# Patient Record
Sex: FEMALE | Race: WHITE | NOT HISPANIC OR LATINO | Employment: FULL TIME | URBAN - METROPOLITAN AREA
[De-identification: names, ages, dates, MRNs, and addresses within clinical notes are randomized per-mention and may not be internally consistent; named-entity substitution may affect disease eponyms.]

---

## 2017-10-31 ENCOUNTER — OFFICE VISIT (OUTPATIENT)
Dept: OBSTETRICS AND GYNECOLOGY | Age: 22
End: 2017-10-31

## 2017-10-31 VITALS
BODY MASS INDEX: 22.36 KG/M2 | DIASTOLIC BLOOD PRESSURE: 80 MMHG | HEIGHT: 69 IN | WEIGHT: 151 LBS | SYSTOLIC BLOOD PRESSURE: 120 MMHG

## 2017-10-31 DIAGNOSIS — N92.6 MISSED MENSES: Primary | ICD-10-CM

## 2017-10-31 DIAGNOSIS — Z01.419 ENCOUNTER FOR GYNECOLOGICAL EXAMINATION: ICD-10-CM

## 2017-10-31 LAB
B-HCG UR QL: NEGATIVE
INTERNAL NEGATIVE CONTROL: NEGATIVE
INTERNAL POSITIVE CONTROL: POSITIVE
Lab: NORMAL

## 2017-10-31 PROCEDURE — 99395 PREV VISIT EST AGE 18-39: CPT | Performed by: NURSE PRACTITIONER

## 2017-10-31 PROCEDURE — 81025 URINE PREGNANCY TEST: CPT | Performed by: NURSE PRACTITIONER

## 2017-10-31 NOTE — PROGRESS NOTES
"Subjective       History of Present Illness    Chief Complaint   Patient presents with   • Gynecologic Exam     Pt requests STD testing       Joana Donis is a 22 y.o. female who presents for annual exam. Patient previously had nexplanon and had it removed last year.  Her periods are very regular. She originally reported her last cycle as 9/6 but realized it was actually 10/6, so she is not late.   She took OCPs for a few months but had difficulty remembering them and stopped them.  She is sexually active and requests testing but declines serum.  She is interested in starting back on OCPs.  She wants something with the least side effects possible, she feels very \"sensitive\" to hormones.    OB History    Para Term  AB Living   0 0 0 0 0 0   SAB TAB Ectopic Multiple Live Births   0 0 0 0 0             The following portions of the patient's history were reviewed and updated as appropriate: allergies, current medications, past family history, past medical history, past social history, past surgical history and problem list.    Her menses are regular every 28-30 days, lasting 4-7 days.    Current contraception: condoms  History of abnormal Pap smear: no  Received Gardasil immunization: no.  Patient declines  Perform regular self breast exam: yes - occasionally  Family history of uterine or ovarian cancer: no  Family History of colon cancer: yes - paternal grandmother in 80s  Family history of breast cancer: yes - paternal aunt in 40s.  She is unsure if she had genetic testing or not.    Mammogram: not indicated.  Colonoscopy: not indicated.  DEXA: not indicated.  Last Pap:never    Smoking status: Former Smoker                                                              Packs/day: 0.00      Years: 0.00         Smokeless status: Not on file                       Exercise: moderately active  Calcium/Vitamin D: uses supplements    The following portions of the patient's history were reviewed and updated " "as appropriate: allergies, current medications, past family history, past medical history, past social history, past surgical history and problem list.    Review of Systems   Constitutional: Negative.    HENT: Negative.    Eyes: Negative.    Respiratory: Negative.    Cardiovascular: Negative.    Gastrointestinal: Negative.    Endocrine: Negative.    Genitourinary: Negative.    Musculoskeletal: Negative.    Skin: Negative.    Allergic/Immunologic: Negative.    Neurological: Negative.    Hematological: Negative.    Psychiatric/Behavioral: Negative.          Objective   Physical Exam   Constitutional: She is oriented to person, place, and time. She appears well-developed and well-nourished.   Neck: No thyroid mass present.   Cardiovascular: Normal rate, regular rhythm and normal heart sounds.    Pulmonary/Chest: Effort normal and breath sounds normal. Right breast exhibits no mass, no nipple discharge, no skin change and no tenderness. Left breast exhibits no mass, no nipple discharge, no skin change and no tenderness.   Abdominal: Soft. There is no tenderness.   Genitourinary: Vagina normal and uterus normal. There is no rash or lesion on the right labia. There is no rash or lesion on the left labia. Cervix exhibits no motion tenderness, no discharge and no friability. Right adnexum displays no mass and no tenderness. Left adnexum displays no mass and no tenderness.   Neurological: She is alert and oriented to person, place, and time.   Skin: Skin is warm and dry.   Psychiatric: She has a normal mood and affect. Her behavior is normal.   Vitals reviewed.      /80  Ht 69\" (175.3 cm)  Wt 151 lb (68.5 kg)  LMP 10/06/2017  BMI 22.3 kg/m2    Assessment/Plan   Joana was seen today for gynecologic exam.    Diagnoses and all orders for this visit:    Missed menses  -     POC Pregnancy, Urine  -     IGP,CtNg,rfx Apt HPV All Pth - ThinPrep Vial, Cervix; Future    Encounter for gynecological examination  -     " IGP,CtNg,rfx Apt HPV All Pth - ThinPrep Vial, Cervix; Future    Other orders  -     Norethin-Eth Estrad-Fe Biphas (LO LOESTRIN FE) 1 MG-10 MCG / 10 MCG tablet; Take 1 tablet by mouth Daily.        Breast self exam technique reviewed and patient encouraged to perform self-exam monthly.  Discussed healthy lifestyle modifications.  Pap smear done today with gc/chl, patient declines serum testing  Recommended 30 minutes of aerobic exercise five times per week.  Discussed calcium needs to prevent osteoporosis

## 2017-11-03 ENCOUNTER — TELEPHONE (OUTPATIENT)
Dept: OBSTETRICS AND GYNECOLOGY | Age: 22
End: 2017-11-03

## 2017-11-03 LAB
C TRACH RRNA CVX QL NAA+PROBE: NEGATIVE
CONV .: NORMAL
CYTOLOGIST CVX/VAG CYTO: NORMAL
CYTOLOGY CVX/VAG DOC THIN PREP: NORMAL
DX ICD CODE: NORMAL
HIV 1 & 2 AB SER-IMP: NORMAL
N GONORRHOEA RRNA CVX QL NAA+PROBE: NEGATIVE
OTHER STN SPEC: NORMAL
PATH REPORT.FINAL DX SPEC: NORMAL
STAT OF ADQ CVX/VAG CYTO-IMP: NORMAL

## 2017-11-03 NOTE — TELEPHONE ENCOUNTER
----- Message from JACOB Fleming sent at 11/3/2017  9:28 AM EDT -----  Notify pap and gc/chl negative

## 2018-10-18 ENCOUNTER — OFFICE VISIT (OUTPATIENT)
Dept: OBSTETRICS AND GYNECOLOGY | Age: 23
End: 2018-10-18

## 2018-10-18 VITALS
BODY MASS INDEX: 22.22 KG/M2 | WEIGHT: 150 LBS | DIASTOLIC BLOOD PRESSURE: 62 MMHG | SYSTOLIC BLOOD PRESSURE: 100 MMHG | HEIGHT: 69 IN

## 2018-10-18 DIAGNOSIS — Z77.21 EXPOSURE TO BLOOD OR BODY FLUID: Primary | ICD-10-CM

## 2018-10-18 DIAGNOSIS — N76.0 ACUTE VAGINITIS: ICD-10-CM

## 2018-10-18 PROCEDURE — 99213 OFFICE O/P EST LOW 20 MIN: CPT | Performed by: NURSE PRACTITIONER

## 2018-10-18 NOTE — PROGRESS NOTES
"Subjective   Joana Donis is a 23 y.o. female is being seen today for   Chief Complaint   Patient presents with   • Vaginal Discharge     Pt c/o a little yelloe discharge and a little odor. Also c/o vaginal discomfort.   .    History of Present Illness     Patient here for vaginal culture and STD test.  She feels she may just be \"paranoid\" but things just feel \"different\" to her for the past few weeks.  She has had some slight vaginal discomfort and change in discharge.  She also noticed an odor occasionally.  She is sexually active.  No change in menses.  She denies any bowel or bladder changes or pelvic pain.  No fevers.      The following portions of the patient's history were reviewed and updated as appropriate: allergies, current medications, past family history, past medical history, past social history, past surgical history and problem list.    /62   Ht 175.3 cm (69\")   Wt 68 kg (150 lb)   LMP 09/22/2018   BMI 22.15 kg/m²         Review of Systems   Constitutional: Negative.    HENT: Negative.    Eyes: Negative.    Respiratory: Negative.    Cardiovascular: Negative.    Gastrointestinal: Negative.    Endocrine: Negative.    Genitourinary: Positive for vaginal discharge and vaginal pain. Negative for genital sores, menstrual problem and pelvic pain.   Musculoskeletal: Negative.    Skin: Negative.    Allergic/Immunologic: Negative.    Neurological: Negative.    Hematological: Negative.    Psychiatric/Behavioral: Negative.        Objective   Physical Exam   Constitutional: She is oriented to person, place, and time. She appears well-developed and well-nourished.   Genitourinary: Uterus normal. Uterus is not tender. Cervix exhibits no motion tenderness, no discharge and no friability.   Genitourinary Comments: Small amount of yellow discharge noted, vagina otherwise normal.  No lesions.    Neurological: She is alert and oriented to person, place, and time.   Psychiatric: She has a normal mood and " affect. Her behavior is normal.         Assessment/Plan   Joana was seen today for vaginal discharge.    Diagnoses and all orders for this visit:    Exposure to blood or body fluid  -     NuSwab VG+ - Swab, Vagina    Acute vaginitis  -     NuSwab VG+ - Swab, Vagina      Vaginal culture sent.  Patient will await results for treatment.  No obvious infection on exam and patient is not symptomatic today.  Follow up PRN

## 2018-10-24 ENCOUNTER — TELEPHONE (OUTPATIENT)
Dept: OBSTETRICS AND GYNECOLOGY | Age: 23
End: 2018-10-24

## 2018-10-26 ENCOUNTER — TELEPHONE (OUTPATIENT)
Dept: OBSTETRICS AND GYNECOLOGY | Age: 23
End: 2018-10-26

## 2018-10-26 LAB
A VAGINAE DNA VAG QL NAA+PROBE: ABNORMAL SCORE
BVAB2 DNA VAG QL NAA+PROBE: ABNORMAL SCORE
C ALBICANS DNA VAG QL NAA+PROBE: NEGATIVE
C GLABRATA DNA VAG QL NAA+PROBE: NEGATIVE
C TRACH RRNA SPEC QL NAA+PROBE: POSITIVE
MEGA1 DNA VAG QL NAA+PROBE: ABNORMAL SCORE
N GONORRHOEA RRNA SPEC QL NAA+PROBE: NEGATIVE
T VAGINALIS RRNA SPEC QL NAA+PROBE: NEGATIVE

## 2018-10-26 RX ORDER — METRONIDAZOLE 500 MG/1
500 TABLET ORAL 2 TIMES DAILY
Qty: 14 TABLET | Refills: 0 | Status: SHIPPED | OUTPATIENT
Start: 2018-10-26 | End: 2018-11-02

## 2018-10-26 RX ORDER — AZITHROMYCIN 500 MG/1
1000 TABLET, FILM COATED ORAL ONCE
Qty: 2 TABLET | Refills: 0 | Status: SHIPPED | OUTPATIENT
Start: 2018-10-26 | End: 2018-10-26

## 2018-10-26 NOTE — TELEPHONE ENCOUNTER
----- Message from JACOB Fleming sent at 10/26/2018  1:54 PM EDT -----  Notify patient vaginal swab showed BV and chlamydia.  I sent in two antibiotics for her to take.  Her partner needs to be treated as well and they should abstain for 1 week after treatment.  Schedule matt as well.

## 2018-10-26 NOTE — TELEPHONE ENCOUNTER
Notified pt who had Ae sched 11-9-18 that was too soon for matt, rs to later so pt did not have to come in 3x in a month.

## 2018-11-26 ENCOUNTER — OFFICE VISIT (OUTPATIENT)
Dept: OBSTETRICS AND GYNECOLOGY | Age: 23
End: 2018-11-26

## 2018-11-26 VITALS
WEIGHT: 149 LBS | SYSTOLIC BLOOD PRESSURE: 126 MMHG | DIASTOLIC BLOOD PRESSURE: 76 MMHG | BODY MASS INDEX: 21.33 KG/M2 | HEIGHT: 70 IN

## 2018-11-26 DIAGNOSIS — Z20.2 CHLAMYDIA CONTACT, TREATED: ICD-10-CM

## 2018-11-26 DIAGNOSIS — Z01.419 WELL WOMAN EXAM WITH ROUTINE GYNECOLOGICAL EXAM: Primary | ICD-10-CM

## 2018-11-26 DIAGNOSIS — Z30.09 CONTRACEPTIVE EDUCATION: ICD-10-CM

## 2018-11-26 PROCEDURE — 99395 PREV VISIT EST AGE 18-39: CPT | Performed by: PHYSICIAN ASSISTANT

## 2018-11-26 RX ORDER — ETONOGESTREL AND ETHINYL ESTRADIOL 11.7; 2.7 MG/1; MG/1
1 INSERT, EXTENDED RELEASE VAGINAL
Qty: 1 EACH | Refills: 12 | Status: SHIPPED | OUTPATIENT
Start: 2018-11-26 | End: 2019-11-26

## 2018-11-26 NOTE — PROGRESS NOTES
Subjective     Chief Complaint   Patient presents with   • Gynecologic Exam     annual exam last pap 2017 neg/no hpv, FRANCIE chlamydia 10/18/2018       History of Present Illness    Joana Donis is a 23 y.o.  who presents for annual exam.    Recently seen and dxed with chlamydia  She let partner know and took her meds, her sx's have improved  She is on BCP but not taking them regularly. Admits that she starts and stops after taking them for a while  She is unsure what she would like to do in the meantime  Uses condoms but not consistently    She is working as a  and a  currently  Admits to [employer] use  Unsure what she wants to do/study    Tries to stay healthy and exercise    She is a pt of Dr Cancino    Her menses are regular every 28-30 days, lasting 0-3 days, dysmenorrhea none   Obstetric History:  OB History      Para Term  AB Living    0 0 0 0 0 0    SAB TAB Ectopic Molar Multiple Live Births    0 0 0 0 0 0         Menstrual History:     Patient's last menstrual period was 10/23/2018 (approximate).         Current contraception: condoms  History of abnormal Pap smear: no  Received Gardasil immunization: no  Perform regular self breast exam: yes - occl  Family history of uterine or ovarian cancer: no  Family History of colon cancer: no  Family history of breast cancer: yes - paternal aunt, unsure age and will look into this    Mammogram: not indicated.  Colonoscopy: not indicated.  DEXA: not indicated.    Exercise: moderately active  Calcium/Vitamin D: adequate intake    The following portions of the patient's history were reviewed and updated as appropriate: allergies, current medications, past family history, past medical history, past social history, past surgical history and problem list.    Review of Systems   Genitourinary:        Recent +chlamydia   All other systems reviewed and are negative.      Review of Systems   Constitutional: Negative for fatigue.  "  Respiratory: Negative for shortness of breath.    Gastrointestinal: Negative for abdominal pain.   Genitourinary: Negative for dysuria.   Neurological: Negative for headaches.   Psychiatric/Behavioral: Negative for dysphoric mood.         Objective   Physical Exam    /76   Ht 176.5 cm (69.5\")   Wt 67.6 kg (149 lb)   LMP 10/23/2018 (Approximate)   Breastfeeding? No   BMI 21.69 kg/m²     General:   alert, appears stated age and cooperative   Neck: no adenopathy and thyroid normal to palpation   Heart: regular rate and rhythm   Lungs: clear to auscultation bilaterally   Abdomen: soft and nontender   Breast: inspection negative, no nipple discharge or bleeding, no masses or nodularity palpable   Vulva: normal   Vagina: normal mucosa, normal discharge   Cervix: no lesions   Uterus: normal size, mobile   Adnexa: normal adnexa and no mass, fullness, tenderness   Rectal: not indicated     Assessment/Plan   Joana was seen today for gynecologic exam.    Diagnoses and all orders for this visit:    Well woman exam with routine gynecological exam  -     Chlamydia trachomatis, Neisseria gonorrhoeae, PCR - Swab, Vagina    Chlamydia contact, treated  -     Chlamydia trachomatis, Neisseria gonorrhoeae, PCR - Swab, Vagina    Contraceptive education    Other orders  -     etonogestrel-ethinyl estradiol (NUVARING) 0.12-0.015 MG/24HR vaginal ring; Insert 1 each into the vagina Every 28 (Twenty-Eight) Days. Insert pv for 3 wks, then remove for 1 wk        All questions answered.  Breast self exam technique reviewed and patient encouraged to perform self-exam monthly.  Discussed healthy lifestyle modifications.  Recommended 30 minutes of aerobic exercise five times per week.  Discussed calcium needs to prevent osteoporosis.    Disc pap guidelines, she is utd  Plan std re check  Enc condoms at all times   Disc BC, she wasn't ready to commit to any BC but I did send in nuva ring and she may consider starting that. Disc proper " use  Also disc kyleena and jose c CHRISTIAN.  She will call with questions.

## 2018-11-28 ENCOUNTER — TELEPHONE (OUTPATIENT)
Dept: OBSTETRICS AND GYNECOLOGY | Age: 23
End: 2018-11-28

## 2018-11-28 LAB
C TRACH RRNA SPEC QL NAA+PROBE: NEGATIVE
N GONORRHOEA RRNA SPEC QL NAA+PROBE: NEGATIVE

## 2018-11-28 NOTE — TELEPHONE ENCOUNTER
----- Message from [facility] Vincent sent at 11/28/2018  8:59 AM EST -----  Let her know her std result is negative

## 2019-12-02 ENCOUNTER — OFFICE VISIT (OUTPATIENT)
Dept: OBSTETRICS AND GYNECOLOGY | Age: 24
End: 2019-12-02

## 2019-12-02 VITALS
SYSTOLIC BLOOD PRESSURE: 120 MMHG | BODY MASS INDEX: 22.96 KG/M2 | WEIGHT: 155 LBS | DIASTOLIC BLOOD PRESSURE: 82 MMHG | HEIGHT: 69 IN

## 2019-12-02 DIAGNOSIS — Z01.419 WELL WOMAN EXAM WITH ROUTINE GYNECOLOGICAL EXAM: Primary | ICD-10-CM

## 2019-12-02 PROCEDURE — 99395 PREV VISIT EST AGE 18-39: CPT | Performed by: PHYSICIAN ASSISTANT

## 2019-12-02 RX ORDER — FLUOXETINE 10 MG/1
10 CAPSULE ORAL DAILY
COMMUNITY
End: 2020-07-10

## 2019-12-02 NOTE — PROGRESS NOTES
Subjective     Chief Complaint   Patient presents with   • Gynecologic Exam     annual exam. last pap 2017 neg/no hpv       History of Present Illness    Joana Donis is a 24 y.o.  who presents for annual exam.    Pt here for her annual exam  She is doing well  Never did start the nuva ring  She has stomach issues and is worried about how she will respond  Has not been SA in over a year  Will plan condoms or call if she wants to try something different    No new med hx    Pt of Dr ramos    Her menses are regular every 28-30 days, lasting 0-3 days, dysmenorrhea none   Obstetric History:  OB History      Para Term  AB Living    0 0 0 0 0 0    SAB TAB Ectopic Molar Multiple Live Births    0 0 0 0 0 0         Menstrual History:     Patient's last menstrual period was 2019 (approximate).         Current contraception: abstinence  History of abnormal Pap smear: no  Received Gardasil immunization: no  Perform regular self breast exam: yes - occl  Family history of uterine or ovarian cancer: no  Family History of colon cancer: yes - PGM in her 70-80's  Family history of breast cancer: yes - paternal aunt in her 40's    Mammogram: not indicated.  Colonoscopy: not indicated.  DEXA: not indicated.    Exercise: moderately active  Calcium/Vitamin D: adequate intake    The following portions of the patient's history were reviewed and updated as appropriate: allergies, current medications, past family history, past medical history, past social history, past surgical history and problem list.    Review of Systems   All other systems reviewed and are negative.      Review of Systems   Constitutional: Negative for fatigue.   Respiratory: Negative for shortness of breath.    Gastrointestinal: Negative for abdominal pain.   Genitourinary: Negative for dysuria.   Neurological: Negative for headaches.   Psychiatric/Behavioral: Negative for dysphoric mood.         Objective   Physical Exam    BP  "120/82   Ht 175.3 cm (69\")   Wt 70.3 kg (155 lb)   LMP 11/18/2019 (Approximate)   Breastfeeding? No   BMI 22.89 kg/m²   General:   alert, comfortable and no distress   Heart: regular rate and rhythm   Lungs: clear to auscultation bilaterally   Breast: normal appearance, no masses or tenderness   Neck: no adenopathy and no carotid bruit   Abdomen: {normal findings: symmetric, soft, non-tender   CVA: Not performed today   Pelvis: External genitalia: normal general appearance  Cervix: normal appearance  Adnexa: normal bimanual exam  Uterus: normal single, nontender  Bladder: wnl   Extremities: Not performed today   Neurologic: negative   Psychiatric: Normal affect, judgement, and mood     Assessment/Plan   Joana was seen today for gynecologic exam.    Diagnoses and all orders for this visit:    Well woman exam with routine gynecological exam        All questions answered.  Breast self exam technique reviewed and patient encouraged to perform self-exam monthly.  Discussed healthy lifestyle modifications.  Recommended 30 minutes of aerobic exercise five times per week.  Discussed calcium needs to prevent osteoporosis.      Pap not needed  Will not need std testing as she has not been SA  Contraceptive options discussed but pt prefers to wait at this time  Disc myrisk, she will check with her aunt to see if she has had genetic testing  Gave HO on gardasil, will consider             "

## 2020-07-10 ENCOUNTER — OFFICE VISIT (OUTPATIENT)
Dept: OBSTETRICS AND GYNECOLOGY | Age: 25
End: 2020-07-10

## 2020-07-10 ENCOUNTER — TELEPHONE (OUTPATIENT)
Dept: OBSTETRICS AND GYNECOLOGY | Age: 25
End: 2020-07-10

## 2020-07-10 VITALS
BODY MASS INDEX: 20.73 KG/M2 | WEIGHT: 140 LBS | DIASTOLIC BLOOD PRESSURE: 60 MMHG | HEIGHT: 69 IN | SYSTOLIC BLOOD PRESSURE: 110 MMHG

## 2020-07-10 DIAGNOSIS — R30.0 DYSURIA: ICD-10-CM

## 2020-07-10 DIAGNOSIS — N89.8 VAGINAL DISCHARGE: Primary | ICD-10-CM

## 2020-07-10 LAB
BILIRUB BLD-MCNC: NEGATIVE MG/DL
CLARITY, POC: CLEAR
COLOR UR: YELLOW
GLUCOSE UR STRIP-MCNC: NEGATIVE MG/DL
KETONES UR QL: NEGATIVE
LEUKOCYTE EST, POC: NEGATIVE
NITRITE UR-MCNC: NEGATIVE MG/ML
PH UR: 8.5 [PH] (ref 5–8)
PROT UR STRIP-MCNC: NEGATIVE MG/DL
RBC # UR STRIP: NEGATIVE /UL
SP GR UR: 1.02 (ref 1–1.03)
UROBILINOGEN UR QL: NORMAL

## 2020-07-10 PROCEDURE — 81002 URINALYSIS NONAUTO W/O SCOPE: CPT | Performed by: PHYSICIAN ASSISTANT

## 2020-07-10 PROCEDURE — 99213 OFFICE O/P EST LOW 20 MIN: CPT | Performed by: PHYSICIAN ASSISTANT

## 2020-07-10 RX ORDER — FLUOXETINE HYDROCHLORIDE 20 MG/1
CAPSULE ORAL
COMMUNITY
Start: 2020-06-23

## 2020-07-10 NOTE — PROGRESS NOTES
"Subjective     Chief Complaint   Patient presents with   • Gynecologic Exam     retained tampon       Joana Donis is a 24 y.o.  whose LMP is Patient's last menstrual period was 2020 (approximate). presents with possible retained tampon    Went to sleep with one in but woke up without it  Can't find it and doesn't feel it   Does note some d/c and a weird odor  Has happened once before     Not currently on BC  In same sex relationship  Advised condoms if SA with male    Pt of Dr ramos  New to me with this conern      No Additional Complaints Reported    The following portions of the patient's history were reviewed and updated as appropriate:vital signs, allergies, current medications, past family history, past medical history, past social history, past surgical history and problem list      Review of Systems   Genitourinary:positive for ?retained tampon     Objective      /60   Ht 175.3 cm (69\")   Wt 63.5 kg (140 lb)   LMP 2020 (Approximate)   Breastfeeding No   BMI 20.67 kg/m²     Physical Exam    General:   alert, comfortable and no distress   Heart: Not performed today   Lungs: Not performed today.   Breast: Not performed today   Neck: na   Abdomen: {Not performed today   CVA: Not performed today   Pelvis: External genitalia: normal general appearance  Vaginal: normal mucosa without prolapse or lesions and no tampons noted  Cervix: normal appearance and neg CMT  Adnexa: normal bimanual exam and non tender  Uterus: normal single, nontender   Extremities: Not performed today   Neurologic: negative   Psychiatric: Normal affect, judgement, and mood       Lab Review   Labs: No data reviewed     Imaging   No data reviewed    Assessment/Plan     ASSESSMENT  1. Vaginal discharge    2. Dysuria        PLAN  1.   Orders Placed This Encounter   Procedures   • Urine Culture - , Urine, Random Void   • POC Urinalysis Dipstick       2. No tampon found. Cervix wnl. Not at high risk of PID or " STD.      Follow up: 5 month(s)    [facility] Weathers  7/10/2020

## 2020-07-10 NOTE — TELEPHONE ENCOUNTER
Pt states she thinks she lost a tampon and its been inside of her for 2 days. Denies any symptoms. Will you see her today?

## 2020-07-12 LAB
BACTERIA UR CULT: NO GROWTH
BACTERIA UR CULT: NORMAL

## 2020-07-13 ENCOUNTER — TELEPHONE (OUTPATIENT)
Dept: OBSTETRICS AND GYNECOLOGY | Age: 25
End: 2020-07-13

## 2020-07-13 NOTE — TELEPHONE ENCOUNTER
----- Message from [facility] Vincent sent at 7/13/2020  9:32 AM EDT -----  Let her know her urine culture is neg for infection

## 2021-10-01 ENCOUNTER — TELEPHONE (OUTPATIENT)
Dept: OBSTETRICS AND GYNECOLOGY | Age: 26
End: 2021-10-01

## 2022-05-12 ENCOUNTER — OFFICE VISIT (OUTPATIENT)
Dept: OBSTETRICS AND GYNECOLOGY | Age: 27
End: 2022-05-12

## 2022-05-12 VITALS
SYSTOLIC BLOOD PRESSURE: 106 MMHG | DIASTOLIC BLOOD PRESSURE: 62 MMHG | WEIGHT: 135 LBS | BODY MASS INDEX: 19.99 KG/M2 | HEIGHT: 69 IN

## 2022-05-12 DIAGNOSIS — R82.998 DARK URINE: ICD-10-CM

## 2022-05-12 DIAGNOSIS — N89.8 VAGINAL DISCHARGE: Primary | ICD-10-CM

## 2022-05-12 PROCEDURE — 99213 OFFICE O/P EST LOW 20 MIN: CPT | Performed by: PHYSICIAN ASSISTANT

## 2022-05-12 NOTE — PROGRESS NOTES
"Subjective     Chief Complaint   Patient presents with   • Gynecologic Exam     C/o possible retained tampon       Joana Donis is a 26 y.o.  whose LMP is Patient's last menstrual period was 2022 (approximate). presents with possible retained tampon    Is noting an odor and feels like she has left sided pelvic pain/gurgling  Has a yellow/green d/c  Placed a tampon 5 days ago and doesn't recall removing it    Denies std risk  Not on BC-in a homosexual relationship    Has annual scheduled next month    No Additional Complaints Reported    The following portions of the patient's history were reviewed and updated as appropriate:vital signs, allergies, current medications, past family history, past medical history, past social history, past surgical history and problem list      Review of Systems   Genitourinary:positive for vaginal d/c     Objective      /62   Ht 175.3 cm (69\")   Wt 61.2 kg (135 lb)   LMP 2022 (Approximate)   Breastfeeding No   BMI 19.94 kg/m²     Physical Exam    General:   alert, comfortable and no distress   Heart: Not performed today   Lungs: Not performed today.   Breast: Not performed today   Neck: Not performed today   Abdomen: Not performed today   CVA: Not performed today   Pelvis: External genitalia: normal general appearance  Vaginal: normal mucosa without prolapse or lesions, discharge, white and no tampon found and no unusual odor noted  Cervix: normal appearance and nuswab obtained  Adnexa: normal bimanual exam  Uterus: normal single, nontender   Extremities: Not performed today   Neurologic: negative   Psychiatric: Normal affect, judgement, and mood       Lab Review   Labs: No data reviewed    Imaging   No data reviewed    Assessment & Plan     ASSESSMENT  1. Vaginal discharge    2. Dark urine          PLAN  1.   Orders Placed This Encounter   Procedures   • NuSwab BV & Candida - Swab, Cervix   • Urine Culture - Urine, Urine, Random Void       2. nuswab " "obtained. No retained tampon noted today. Does note a \"gurgly\" sensation. Suspect GI relate but urine to be sent off    Follow up: 1 month(s)    [facility] Weathers  5/12/2022           "

## 2022-05-13 LAB
A VAGINAE DNA VAG QL NAA+PROBE: NORMAL SCORE
BVAB2 DNA VAG QL NAA+PROBE: NORMAL SCORE
C ALBICANS DNA VAG QL NAA+PROBE: NEGATIVE
C GLABRATA DNA VAG QL NAA+PROBE: NEGATIVE
MEGA1 DNA VAG QL NAA+PROBE: NORMAL SCORE

## 2022-05-14 LAB
BACTERIA UR CULT: NORMAL
BACTERIA UR CULT: NORMAL

## 2022-05-16 ENCOUNTER — TELEPHONE (OUTPATIENT)
Dept: OBSTETRICS AND GYNECOLOGY | Age: 27
End: 2022-05-16

## 2022-05-16 NOTE — TELEPHONE ENCOUNTER
----- Message from [facility] Vincent sent at 5/16/2022  9:35 AM EDT -----  Let her know her urine result is wnl and her vaginal swab is negative for bv and yeast

## 2022-06-01 ENCOUNTER — OFFICE VISIT (OUTPATIENT)
Dept: OBSTETRICS AND GYNECOLOGY | Age: 27
End: 2022-06-01

## 2022-06-01 VITALS
SYSTOLIC BLOOD PRESSURE: 118 MMHG | DIASTOLIC BLOOD PRESSURE: 70 MMHG | WEIGHT: 132 LBS | HEIGHT: 69 IN | BODY MASS INDEX: 19.55 KG/M2

## 2022-06-01 DIAGNOSIS — Z01.419 WELL WOMAN EXAM WITH ROUTINE GYNECOLOGICAL EXAM: Primary | ICD-10-CM

## 2022-06-01 DIAGNOSIS — Z12.4 ENCOUNTER FOR PAPANICOLAOU SMEAR FOR CERVICAL CANCER SCREENING: ICD-10-CM

## 2022-06-01 PROCEDURE — 99395 PREV VISIT EST AGE 18-39: CPT | Performed by: PHYSICIAN ASSISTANT

## 2022-06-01 PROCEDURE — 3008F BODY MASS INDEX DOCD: CPT | Performed by: PHYSICIAN ASSISTANT

## 2022-06-01 RX ORDER — LIDOCAINE 50 MG/G
PATCH TOPICAL
COMMUNITY
Start: 2022-05-17

## 2022-06-01 RX ORDER — CYCLOBENZAPRINE HCL 10 MG
TABLET ORAL
COMMUNITY
Start: 2022-05-17

## 2022-06-01 RX ORDER — ONDANSETRON 4 MG/1
TABLET, ORALLY DISINTEGRATING ORAL
COMMUNITY
Start: 2022-05-17
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
## 2022-06-03 LAB
CONV .: NORMAL
CYTOLOGIST CVX/VAG CYTO: NORMAL
CYTOLOGY CVX/VAG DOC CYTO: NORMAL
CYTOLOGY CVX/VAG DOC THIN PREP: NORMAL
DX ICD CODE: NORMAL
HIV 1 & 2 AB SER-IMP: NORMAL
OTHER STN SPEC: NORMAL
STAT OF ADQ CVX/VAG CYTO-IMP: NORMAL

## 2022-06-06 ENCOUNTER — TELEPHONE (OUTPATIENT)
Dept: OBSTETRICS AND GYNECOLOGY | Age: 27
End: 2022-06-06

## 2022-06-06 NOTE — TELEPHONE ENCOUNTER
----- Message from [facility] Vincent sent at 6/6/2022 10:56 AM EDT -----  Please notify pt that her pap is wnl

## 2023-03-30 ENCOUNTER — OFFICE VISIT (OUTPATIENT)
Dept: OBSTETRICS AND GYNECOLOGY | Age: 28
End: 2023-03-30
Payer: COMMERCIAL

## 2023-03-30 VITALS
WEIGHT: 133 LBS | SYSTOLIC BLOOD PRESSURE: 128 MMHG | DIASTOLIC BLOOD PRESSURE: 76 MMHG | HEIGHT: 69 IN | BODY MASS INDEX: 19.7 KG/M2

## 2023-03-30 DIAGNOSIS — Z11.3 SCREEN FOR STD (SEXUALLY TRANSMITTED DISEASE): Primary | ICD-10-CM

## 2023-03-30 PROCEDURE — 99213 OFFICE O/P EST LOW 20 MIN: CPT | Performed by: PHYSICIAN ASSISTANT
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
## 2023-03-31 LAB
HBV SURFACE AG SERPL QL IA: NEGATIVE
HCV IGG SERPL QL IA: NON REACTIVE
HIV 1+2 AB+HIV1 P24 AG SERPL QL IA: NON REACTIVE
RPR SER QL: NON REACTIVE

## 2023-04-01 LAB
C TRACH RRNA SPEC QL NAA+PROBE: NEGATIVE
N GONORRHOEA RRNA SPEC QL NAA+PROBE: NEGATIVE
T VAGINALIS RRNA SPEC QL NAA+PROBE: NEGATIVE

## 2023-10-12 ENCOUNTER — OFFICE VISIT (OUTPATIENT)
Dept: OBSTETRICS AND GYNECOLOGY | Age: 28
End: 2023-10-12
Payer: COMMERCIAL

## 2023-10-12 VITALS — WEIGHT: 139 LBS | BODY MASS INDEX: 19.9 KG/M2 | HEIGHT: 70 IN

## 2023-10-12 DIAGNOSIS — Z01.419 WELL WOMAN EXAM WITH ROUTINE GYNECOLOGICAL EXAM: Primary | ICD-10-CM

## 2023-10-12 NOTE — PROGRESS NOTES
Subjective     Chief Complaint   Patient presents with    Gynecologic Exam     Annual, Last pap 2022 neg  Cc:  no problem       History of Present Illness    Joana Donis is a 28 y.o.  who presents for annual exam.    Doing well  Her menses are regular every 28-30 days, lasting 4-7 days, dysmenorrhea none   Pap UTD  Using condoms for contraception  Declines other forms of contraception  Declines std screening  No other GYN concerns or complaints today    Obstetric History:  OB History          0    Para   0    Term   0       0    AB   0    Living   0         SAB   0    IAB   0    Ectopic   0    Molar   0    Multiple   0    Live Births   0               Menstrual History:     Patient's last menstrual period was 10/02/2023.         Current contraception: condoms  History of abnormal Pap smear: no  Received Gardasil immunization: no  Perform regular self breast exam: yes - .  Family history of uterine or ovarian cancer: no  Family History of colon cancer: yes - PGM  Family history of breast cancer: yes - paternal aunt    Mammogram: not indicated.  Colonoscopy: not indicated.  DEXA: not indicated.    Exercise: very active or moderately active - walks 5-6 miles a day, works at restaurant so on feet a lot  Calcium/Vitamin D: adequate intake    The following portions of the patient's history were reviewed and updated as appropriate: allergies, current medications, past family history, past medical history, past social history, past surgical history, and problem list.    Review of Systems   Constitutional: Negative.    Respiratory: Negative.     Cardiovascular: Negative.    Gastrointestinal: Negative.    Genitourinary: Negative.    Skin: Negative.    Psychiatric/Behavioral: Negative.             Objective   Physical Exam  Constitutional:       General: She is awake.      Appearance: Normal appearance. She is well-developed.   HENT:      Head: Normocephalic and atraumatic.      Nose: Nose  normal.   Neck:      Thyroid: No thyroid mass, thyromegaly or thyroid tenderness.   Cardiovascular:      Rate and Rhythm: Normal rate and regular rhythm.      Pulses: Normal pulses.      Heart sounds: Normal heart sounds.   Pulmonary:      Effort: Pulmonary effort is normal.      Breath sounds: Normal breath sounds.   Chest:   Breasts:     Breasts are symmetrical.      Right: Normal. No swelling, bleeding, inverted nipple, mass, nipple discharge, skin change or tenderness.      Left: Normal. No swelling, bleeding, inverted nipple, mass, nipple discharge, skin change or tenderness.   Abdominal:      General: Abdomen is flat. Bowel sounds are normal.      Palpations: Abdomen is soft.      Tenderness: There is no abdominal tenderness.   Genitourinary:     General: Normal vulva.      Labia:         Right: No rash, tenderness, lesion or injury.         Left: No rash, tenderness, lesion or injury.       Urethra: No prolapse, urethral pain, urethral swelling or urethral lesion.      Vagina: Normal. No signs of injury. No vaginal discharge, erythema, tenderness, bleeding, lesions or prolapsed vaginal walls.      Cervix: Normal. No discharge, friability, lesion, erythema or cervical bleeding.      Uterus: Normal. Not enlarged, not tender and no uterine prolapse.       Adnexa: Right adnexa normal and left adnexa normal.        Right: No mass, tenderness or fullness.          Left: No mass, tenderness or fullness.        Rectum: Normal. No mass.   Musculoskeletal:      Cervical back: Normal range of motion and neck supple.   Lymphadenopathy:      Upper Body:      Right upper body: No supraclavicular adenopathy.      Left upper body: No supraclavicular adenopathy.   Skin:     General: Skin is warm and dry.   Neurological:      General: No focal deficit present.      Mental Status: She is alert and oriented to person, place, and time.   Psychiatric:         Mood and Affect: Mood normal.         Behavior: Behavior normal.  "Behavior is cooperative.         Thought Content: Thought content normal.         Judgment: Judgment normal.         Ht 177.8 cm (70\")   Wt 63 kg (139 lb)   LMP 10/02/2023   BMI 19.94 kg/mý     Assessment & Plan   Diagnoses and all orders for this visit:    1. Well woman exam with routine gynecological exam (Primary)        All questions answered.  Breast self exam technique reviewed and patient encouraged to perform self-exam monthly.  Discussed healthy lifestyle modifications.  Recommended 30 minutes of aerobic exercise five times per week.  Discussed calcium needs to prevent osteoporosis.    -F/u 1 year                "

## 2023-10-24 ENCOUNTER — TELEPHONE (OUTPATIENT)
Dept: OBSTETRICS AND GYNECOLOGY | Age: 28
End: 2023-10-24
Payer: COMMERCIAL

## 2023-10-24 RX ORDER — NORETHINDRONE ACETATE AND ETHINYL ESTRADIOL 1MG-20(21)
1 KIT ORAL DAILY
Qty: 28 TABLET | Refills: 12 | Status: SHIPPED | OUTPATIENT
Start: 2023-10-24 | End: 2024-10-23

## 2023-10-24 NOTE — TELEPHONE ENCOUNTER
Caller: Joana Donis    Relationship: Self    Best call back number: [phone]    Requested Prescriptions: BIRTH CONTROL PILLS    Pharm[facility] where request should be sent: [facility]     Last office visit with prescribing clinician: 10/12/2023     Next office visit with prescribing clinician: 10/17/2024     Additional details provided by patient: PATIENT STATED THAT THEY HAD DISCUSSED BIRTH CONTROL AT HER APPT WHICH WE DECLINED AT THE TIME, BUT SHE HAS CHANGED HER MIND AND WOULD LIKE TO KNOW IF SOMETHING CAN BE SENT IN FOR HER    Would you like a call back once the refill request has been completed: [] Yes [x] No

## 2024-09-19 ENCOUNTER — OFFICE VISIT (OUTPATIENT)
Dept: OBSTETRICS AND GYNECOLOGY | Age: 29
End: 2024-09-19
Payer: COMMERCIAL

## 2024-09-19 VITALS
BODY MASS INDEX: 20.04 KG/M2 | DIASTOLIC BLOOD PRESSURE: 68 MMHG | HEIGHT: 70 IN | SYSTOLIC BLOOD PRESSURE: 110 MMHG | WEIGHT: 140 LBS

## 2024-09-19 DIAGNOSIS — Z30.09 COUNSELING FOR BIRTH CONTROL REGARDING INTRAUTERINE DEVICE (IUD): Primary | ICD-10-CM

## 2024-09-19 DIAGNOSIS — Z11.3 SCREEN FOR STD (SEXUALLY TRANSMITTED DISEASE): ICD-10-CM

## 2024-10-28 ENCOUNTER — TELEPHONE (OUTPATIENT)
Dept: OBSTETRICS AND GYNECOLOGY | Age: 29
End: 2024-10-28

## 2024-10-28 NOTE — TELEPHONE ENCOUNTER
Hub staff attempted to follow warm transfer process and was unsuccessful     Caller: Joana Donis    Relationship to patient: Self    Best call back number: [phone]    Patient is needing: PT NEEDS TO RESCHEDULE HER IUD PLACEMENT APPT

## 2024-11-19 ENCOUNTER — OFFICE VISIT (OUTPATIENT)
Dept: OBSTETRICS AND GYNECOLOGY | Age: 29
End: 2024-11-19
Payer: COMMERCIAL

## 2024-11-19 VITALS
SYSTOLIC BLOOD PRESSURE: 118 MMHG | BODY MASS INDEX: 21.76 KG/M2 | HEIGHT: 70 IN | DIASTOLIC BLOOD PRESSURE: 74 MMHG | WEIGHT: 152 LBS

## 2024-11-19 DIAGNOSIS — Z30.430 ENCOUNTER FOR IUD INSERTION: Primary | ICD-10-CM

## 2024-11-19 DIAGNOSIS — Z01.812 PRE-PROCEDURE LAB EXAM: ICD-10-CM

## 2024-11-19 LAB
B-HCG UR QL: NEGATIVE
EXPIRATION DATE: NORMAL
INTERNAL NEGATIVE CONTROL: NORMAL
INTERNAL POSITIVE CONTROL: NORMAL
Lab: NORMAL

## 2024-11-19 RX ORDER — COPPER 313.4 MG/1
INTRAUTERINE DEVICE INTRAUTERINE ONCE
Status: COMPLETED | OUTPATIENT
Start: 2024-11-19 | End: 2024-11-19

## 2024-11-19 RX ADMIN — COPPER: 313.4 INTRAUTERINE DEVICE INTRAUTERINE at 11:37

## 2024-11-19 NOTE — PROGRESS NOTES
IUD Insertion    Patient's last menstrual period was 11/07/2024.    Date of procedure:  11/19/2024    Risks and benefits discussed? yes  All questions answered? yes  Consents given by The patient  Written consent obtained? yes    Procedure documentation:     Urine pregnancy test was done and was NEGATIVE .  The risks (including infection,  bleeding, pain, and uterine perforation) and benefits of the procedure were  explained to the patient and Written informed consent was  obtained.    After verifying the patient had a low probability of being pregnant and met the criteria for insertion, a sterile speculum has placed and the cervix was cleansed with an antiseptic solution.  Vaginal discharge was scant.  The anterior lip of the cervix was grasped with an allis and the uterine cavity was gently sounded. There was no difficulty passing the sound through the cervix.  Cervical dilation did not need to be performed prior to placing the IUD.  The uterus was anteverted and sounded to 7.5 cms.  The ParaGard was then prepared per the manufacturers instructions.    The Paraguard was advanced through the cervical canal until the upper edge of the flange was flush with the external cervix.  The insertion pinky was held in place while the insertion tube was withdrawn.  I waited 10-15 seconds for the arms of the IUS to fully open and the devise to seat in the cavity.  The pinky was removed first followed by the insertion tube.. The string was cut 2 cms in length.  Bleeding from the cervix was scant.    She tolerated the procedure without any difficulty.    Post procedure instructions: The patient was advised to call for any fever or for  prolonged or severe pain or bleeding. Pelvic rest  advised for 2 wks    Follow up needed: 6 weeks for IUD check

## 2024-12-05 ENCOUNTER — OFFICE VISIT (OUTPATIENT)
Dept: OBSTETRICS AND GYNECOLOGY | Age: 29
End: 2024-12-05
Payer: COMMERCIAL

## 2024-12-05 VITALS
DIASTOLIC BLOOD PRESSURE: 64 MMHG | WEIGHT: 151 LBS | BODY MASS INDEX: 21.62 KG/M2 | HEIGHT: 70 IN | SYSTOLIC BLOOD PRESSURE: 108 MMHG

## 2024-12-05 DIAGNOSIS — Z12.4 SCREENING FOR CERVICAL CANCER: ICD-10-CM

## 2024-12-05 DIAGNOSIS — Z01.419 WELL WOMAN EXAM WITH ROUTINE GYNECOLOGICAL EXAM: Primary | ICD-10-CM

## 2024-12-05 DIAGNOSIS — Z97.5 IUD (INTRAUTERINE DEVICE) IN PLACE: ICD-10-CM

## 2024-12-05 NOTE — PROGRESS NOTES
Subjective     Chief Complaint   Patient presents with    Gynecologic Exam     Annual, Last pap 2022 neg  Cc:  no problems       History of Present Illness    Joana Donis is a 29 y.o.  who presents for annual exam.    Doing well  Had Paragard IUD placed a couple of weeks ago   Had cramping for a few days but doing well with it  Hasn't had a period yet  Her menses are regular every 28-30 days, lasting 4-7 days, dysmenorrhea none   She has no there GYN concerns or complaints  Obstetric History:  OB History          0    Para   0    Term   0       0    AB   0    Living   0         SAB   0    IAB   0    Ectopic   0    Molar   0    Multiple   0    Live Births   0               Menstrual History:     Patient's last menstrual period was 2024.         Current contraception: IUD  History of abnormal Pap smear: no  Received Gardasil immunization: yes  Perform regular self breast exam: yes - occl  Family history of uterine or ovarian cancer: no  Family History of colon cancer: yes - PGM  Family history of breast cancer: yes - paternal aunt    Mammogram: not indicated.  Colonoscopy: not indicated.  DEXA: not indicated.    Exercise: moderately active  Calcium/Vitamin D: adequate intake    The following portions of the patient's history were reviewed and updated as appropriate: allergies, current medications, past family history, past medical history, past social history, past surgical history, and problem list.    Review of Systems   Constitutional: Negative.    Respiratory: Negative.     Cardiovascular: Negative.    Gastrointestinal: Negative.    Genitourinary: Negative.    Skin: Negative.    Psychiatric/Behavioral: Negative.             Objective   Physical Exam  Constitutional:       General: She is awake.      Appearance: Normal appearance. She is well-developed.   HENT:      Head: Normocephalic and atraumatic.      Nose: Nose normal.   Neck:      Thyroid: No thyroid mass, thyromegaly  or thyroid tenderness.   Cardiovascular:      Rate and Rhythm: Normal rate and regular rhythm.      Pulses: Normal pulses.      Heart sounds: Normal heart sounds.   Pulmonary:      Effort: Pulmonary effort is normal.      Breath sounds: Normal breath sounds.   Chest:   Breasts:     Breasts are symmetrical.      Right: Normal. No swelling, bleeding, inverted nipple, mass, nipple discharge, skin change or tenderness.      Left: Normal. No swelling, bleeding, inverted nipple, mass, nipple discharge, skin change or tenderness.   Abdominal:      General: Abdomen is flat. Bowel sounds are normal.      Palpations: Abdomen is soft.      Tenderness: There is no abdominal tenderness.   Genitourinary:     General: Normal vulva.      Labia:         Right: No rash, tenderness, lesion or injury.         Left: No rash, tenderness, lesion or injury.       Urethra: No prolapse, urethral pain, urethral swelling or urethral lesion.      Vagina: Normal. No signs of injury. No vaginal discharge, erythema, tenderness, bleeding, lesions or prolapsed vaginal walls.      Cervix: Normal. No discharge, friability, lesion, erythema or cervical bleeding.      Uterus: Normal. Not enlarged, not tender and no uterine prolapse.       Adnexa: Right adnexa normal and left adnexa normal.        Right: No mass, tenderness or fullness.          Left: No mass, tenderness or fullness.        Rectum: Normal. No mass.      Comments: IUD strings seen  Musculoskeletal:      Cervical back: Normal range of motion and neck supple.   Lymphadenopathy:      Upper Body:      Right upper body: No supraclavicular adenopathy.      Left upper body: No supraclavicular adenopathy.   Skin:     General: Skin is warm and dry.   Neurological:      General: No focal deficit present.      Mental Status: She is alert and oriented to person, place, and time.   Psychiatric:         Mood and Affect: Mood normal.         Behavior: Behavior normal. Behavior is cooperative.          "Thought Content: Thought content normal.         Judgment: Judgment normal.         /64   Ht 177.8 cm (70\")   Wt 68.5 kg (151 lb)   LMP 11/07/2024   BMI 21.67 kg/m²     Assessment & Plan   Diagnoses and all orders for this visit:    1. Well woman exam with routine gynecological exam (Primary)    2. Screening for cervical cancer  -     IGP, Rfx Aptima HPV ASCU    3. IUD (intrauterine device) in place        All questions answered.  Breast self exam technique reviewed and patient encouraged to perform self-exam monthly.  Discussed healthy lifestyle modifications.  Recommended 30 minutes of aerobic exercise five times per week.  Discussed calcium needs to prevent osteoporosis.    -F/u 1 year               "

## 2024-12-11 LAB
CONV .: NORMAL
CYTOLOGIST CVX/VAG CYTO: NORMAL
CYTOLOGY CVX/VAG DOC CYTO: NORMAL
CYTOLOGY CVX/VAG DOC THIN PREP: NORMAL
DX ICD CODE: NORMAL
Lab: NORMAL
OTHER STN SPEC: NORMAL
STAT OF ADQ CVX/VAG CYTO-IMP: NORMAL

## 2025-02-13 ENCOUNTER — OFFICE VISIT (OUTPATIENT)
Dept: OBSTETRICS AND GYNECOLOGY | Age: 30
End: 2025-02-13
Payer: COMMERCIAL

## 2025-02-13 VITALS
DIASTOLIC BLOOD PRESSURE: 72 MMHG | SYSTOLIC BLOOD PRESSURE: 114 MMHG | HEIGHT: 70 IN | BODY MASS INDEX: 21.9 KG/M2 | WEIGHT: 153 LBS

## 2025-02-13 DIAGNOSIS — T83.84XA PAIN DUE TO INTRAUTERINE CONTRACEPTIVE DEVICE (IUD), INITIAL ENCOUNTER: Primary | ICD-10-CM

## 2025-02-13 NOTE — PROGRESS NOTES
"Subjective     Chief Complaint   Patient presents with    Gynecologic Exam     Pain with IUD, feeling better today       Joana Donis is a 29 y.o.  whose LMP is Patient's last menstrual period was 2025.     Pt presents today with chief complaint of pelvic pain with IUD  She was on a ski trip and was wearing a tampon and removed it and the tampon tore and after that she was having signficant pain for a couple days  She states it has mostly resolved now  She is overall happy with the iud just wanted to make sure it didn't move    No Additional Complaints Reported    The following portions of the patient's history were reviewed and updated as appropriate:vital signs, allergies, current medications, past medical history, past social history, past surgical history, and problem list      Review of Systems   Pertinent items are noted in HPI.     Objective      /72   Ht 177.8 cm (70\")   Wt 69.4 kg (153 lb)   LMP 2025   BMI 21.95 kg/m²     Physical Exam    General:   alert and no distress   Heart: Not performed today   Lungs: Not performed today.   Breast: Not performed today   Neck: na   Abdomen: {Not performed today   CVA: Not performed today   Pelvis: Not performed today   Extremities: Not performed today   Neurologic: negative   Psychiatric: Normal affect, judgement, and mood       Lab Review   Labs: No data reviewed     Imaging   Ultrasound - Pelvic Vaginal    Assessment & Plan     ASSESSMENT  1. Pain due to intrauterine contraceptive device (IUD), initial encounter        PLAN  1. No orders of the defined types were placed in this encounter.      2. Medications prescribed this encounter:      No orders of the defined types were placed in this encounter.      3. IUD in place. Pain resolved.    Follow up: next annual appt, vincent Silver, APRN  2025           "